# Patient Record
Sex: MALE | Race: BLACK OR AFRICAN AMERICAN | Employment: OTHER | URBAN - METROPOLITAN AREA
[De-identification: names, ages, dates, MRNs, and addresses within clinical notes are randomized per-mention and may not be internally consistent; named-entity substitution may affect disease eponyms.]

---

## 2017-08-07 PROBLEM — R50.9 FEVER: Status: ACTIVE | Noted: 2017-08-07

## 2017-08-10 PROBLEM — D72.829 LEUKOCYTOSIS: Status: RESOLVED | Noted: 2017-08-07 | Resolved: 2017-08-10

## 2023-02-03 ENCOUNTER — HOSPITAL ENCOUNTER (EMERGENCY)
Age: 73
Discharge: ANOTHER HOSPITAL | End: 2023-02-04
Attending: EMERGENCY MEDICINE
Payer: MEDICARE

## 2023-02-03 ENCOUNTER — APPOINTMENT (OUTPATIENT)
Dept: GENERAL RADIOLOGY | Age: 73
End: 2023-02-03
Attending: EMERGENCY MEDICINE
Payer: MEDICARE

## 2023-02-03 DIAGNOSIS — J96.01 ACUTE RESPIRATORY FAILURE WITH HYPOXIA AND HYPERCAPNIA (HCC): Primary | ICD-10-CM

## 2023-02-03 DIAGNOSIS — J96.02 ACUTE RESPIRATORY FAILURE WITH HYPOXIA AND HYPERCAPNIA (HCC): Primary | ICD-10-CM

## 2023-02-03 DIAGNOSIS — N18.9 CHRONIC KIDNEY DISEASE, UNSPECIFIED CKD STAGE: ICD-10-CM

## 2023-02-03 DIAGNOSIS — Z94.4 LIVER TRANSPLANT RECIPIENT (HCC): ICD-10-CM

## 2023-02-03 DIAGNOSIS — E87.20 LACTIC ACIDOSIS: ICD-10-CM

## 2023-02-03 DIAGNOSIS — J81.0 ACUTE PULMONARY EDEMA (HCC): ICD-10-CM

## 2023-02-03 LAB
ALBUMIN SERPL-MCNC: 3 G/DL (ref 3.5–5)
ALBUMIN/GLOB SERPL: 0.8 (ref 1.1–2.2)
ALP SERPL-CCNC: 109 U/L (ref 45–117)
ALT SERPL-CCNC: 26 U/L (ref 12–78)
AMMONIA PLAS-SCNC: 37 UMOL/L
ANION GAP BLD CALC-SCNC: 9 (ref 10–20)
ANION GAP SERPL CALC-SCNC: 10 MMOL/L (ref 5–15)
ARTERIAL PATENCY WRIST A: POSITIVE
AST SERPL-CCNC: 34 U/L (ref 15–37)
BASE DEFICIT BLD-SCNC: 11 MMOL/L
BASE DEFICIT BLD-SCNC: 18.9 MMOL/L
BASOPHILS # BLD: 0 K/UL (ref 0–0.1)
BASOPHILS NFR BLD: 0 % (ref 0–1)
BDY SITE: ABNORMAL
BILIRUB SERPL-MCNC: 0.3 MG/DL (ref 0.2–1)
BNP SERPL-MCNC: ABNORMAL PG/ML
BUN SERPL-MCNC: 94 MG/DL (ref 6–20)
BUN/CREAT SERPL: 14 (ref 12–20)
CA-I BLD-MCNC: 1.31 MMOL/L (ref 1.12–1.32)
CA-I BLD-SCNC: 1.26 MMOL/L (ref 1.12–1.32)
CALCIUM SERPL-MCNC: 8.6 MG/DL (ref 8.5–10.1)
CHLORIDE BLD-SCNC: 111 MMOL/L (ref 100–108)
CHLORIDE SERPL-SCNC: 107 MMOL/L (ref 97–108)
CO2 BLD-SCNC: 17 MMOL/L (ref 19–24)
CO2 SERPL-SCNC: 19 MMOL/L (ref 21–32)
COMMENT, HOLDF: NORMAL
CREAT SERPL-MCNC: 6.69 MG/DL (ref 0.7–1.3)
CREAT UR-MCNC: 6.4 MG/DL (ref 0.6–1.3)
DIFFERENTIAL METHOD BLD: ABNORMAL
EOSINOPHIL # BLD: 0.2 K/UL (ref 0–0.4)
EOSINOPHIL NFR BLD: 1 % (ref 0–7)
ERYTHROCYTE [DISTWIDTH] IN BLOOD BY AUTOMATED COUNT: 15.5 % (ref 11.5–14.5)
GAS FLOW.O2 O2 DELIVERY SYS: ABNORMAL
GAS FLOW.O2 SETTING OXYMISER: 18 BPM
GLOBULIN SER CALC-MCNC: 4 G/DL (ref 2–4)
GLUCOSE BLD STRIP.AUTO-MCNC: 295 MG/DL (ref 74–106)
GLUCOSE SERPL-MCNC: 291 MG/DL (ref 65–100)
HCO3 BLD-SCNC: 20.2 MMOL/L (ref 22–26)
HCO3 BLDA-SCNC: 16 MMOL/L
HCT VFR BLD AUTO: 34.3 % (ref 36.6–50.3)
HGB BLD-MCNC: 9.6 G/DL (ref 12.1–17)
IMM GRANULOCYTES # BLD AUTO: 0 K/UL (ref 0–0.04)
IMM GRANULOCYTES NFR BLD AUTO: 0 % (ref 0–0.5)
LACTATE BLD-SCNC: 7.5 MMOL/L (ref 0.4–2)
LACTATE SERPL-SCNC: 7 MMOL/L (ref 0.4–2)
LYMPHOCYTES # BLD: 8.1 K/UL (ref 0.8–3.5)
LYMPHOCYTES NFR BLD: 45 % (ref 12–49)
MCH RBC QN AUTO: 26.4 PG (ref 26–34)
MCHC RBC AUTO-ENTMCNC: 28 G/DL (ref 30–36.5)
MCV RBC AUTO: 94.2 FL (ref 80–99)
MONOCYTES # BLD: 0.5 K/UL (ref 0–1)
MONOCYTES NFR BLD: 3 % (ref 5–13)
NEUTS SEG # BLD: 9.3 K/UL (ref 1.8–8)
NEUTS SEG NFR BLD: 51 % (ref 32–75)
NRBC # BLD: 0 K/UL (ref 0–0.01)
NRBC BLD-RTO: 0 PER 100 WBC
O2/TOTAL GAS SETTING VFR VENT: 100 %
PCO2 BLD: 77.3 MMHG (ref 35–45)
PCO2 BLDV: 96 MMHG (ref 41–51)
PEEP RESPIRATORY: 8 CMH2O
PH BLD: 7.02 (ref 7.35–7.45)
PH BLDV: 6.84 (ref 7.32–7.42)
PLATELET # BLD AUTO: 315 K/UL (ref 150–400)
PMV BLD AUTO: 10.1 FL (ref 8.9–12.9)
PO2 BLD: 410 MMHG (ref 80–100)
PO2 BLDV: 65 MMHG (ref 25–40)
POTASSIUM BLD-SCNC: 5.5 MMOL/L (ref 3.5–5.5)
POTASSIUM SERPL-SCNC: 5.6 MMOL/L (ref 3.5–5.1)
PROT SERPL-MCNC: 7 G/DL (ref 6.4–8.2)
RBC # BLD AUTO: 3.64 M/UL (ref 4.1–5.7)
RBC MORPH BLD: ABNORMAL
RBC MORPH BLD: ABNORMAL
SAMPLES BEING HELD,HOLD: NORMAL
SAO2 % BLD: 69 %
SAO2 % BLD: 99.9 % (ref 92–97)
SERVICE CMNT-IMP: ABNORMAL
SERVICE CMNT-IMP: ABNORMAL
SODIUM BLD-SCNC: 137 MMOL/L (ref 136–145)
SODIUM SERPL-SCNC: 136 MMOL/L (ref 136–145)
SPECIMEN SITE: ABNORMAL
SPECIMEN TYPE: ABNORMAL
TROPONIN I SERPL HS-MCNC: 108 NG/L (ref 0–76)
VENTILATION MODE VENT: ABNORMAL
VT SETTING VENT: 500 ML
WBC # BLD AUTO: 18.1 K/UL (ref 4.1–11.1)

## 2023-02-03 PROCEDURE — 83605 ASSAY OF LACTIC ACID: CPT

## 2023-02-03 PROCEDURE — 93005 ELECTROCARDIOGRAM TRACING: CPT

## 2023-02-03 PROCEDURE — 83880 ASSAY OF NATRIURETIC PEPTIDE: CPT

## 2023-02-03 PROCEDURE — 36415 COLL VENOUS BLD VENIPUNCTURE: CPT

## 2023-02-03 PROCEDURE — 74018 RADEX ABDOMEN 1 VIEW: CPT

## 2023-02-03 PROCEDURE — 74011250636 HC RX REV CODE- 250/636: Performed by: EMERGENCY MEDICINE

## 2023-02-03 PROCEDURE — 80053 COMPREHEN METABOLIC PANEL: CPT

## 2023-02-03 PROCEDURE — 71045 X-RAY EXAM CHEST 1 VIEW: CPT

## 2023-02-03 PROCEDURE — 87040 BLOOD CULTURE FOR BACTERIA: CPT

## 2023-02-03 PROCEDURE — 74011000250 HC RX REV CODE- 250: Performed by: EMERGENCY MEDICINE

## 2023-02-03 PROCEDURE — 74011250636 HC RX REV CODE- 250/636

## 2023-02-03 PROCEDURE — 84484 ASSAY OF TROPONIN QUANT: CPT

## 2023-02-03 PROCEDURE — 74011000250 HC RX REV CODE- 250

## 2023-02-03 PROCEDURE — 99285 EMERGENCY DEPT VISIT HI MDM: CPT

## 2023-02-03 PROCEDURE — 82947 ASSAY GLUCOSE BLOOD QUANT: CPT

## 2023-02-03 PROCEDURE — 31500 INSERT EMERGENCY AIRWAY: CPT

## 2023-02-03 PROCEDURE — 96375 TX/PRO/DX INJ NEW DRUG ADDON: CPT

## 2023-02-03 PROCEDURE — 36600 WITHDRAWAL OF ARTERIAL BLOOD: CPT

## 2023-02-03 PROCEDURE — 74011000258 HC RX REV CODE- 258: Performed by: EMERGENCY MEDICINE

## 2023-02-03 PROCEDURE — 82803 BLOOD GASES ANY COMBINATION: CPT

## 2023-02-03 PROCEDURE — 94002 VENT MGMT INPAT INIT DAY: CPT

## 2023-02-03 PROCEDURE — 85025 COMPLETE CBC W/AUTO DIFF WBC: CPT

## 2023-02-03 PROCEDURE — 82140 ASSAY OF AMMONIA: CPT

## 2023-02-03 PROCEDURE — 96365 THER/PROPH/DIAG IV INF INIT: CPT

## 2023-02-03 RX ORDER — PROPOFOL 10 MG/ML
INJECTION, EMULSION INTRAVENOUS
Status: COMPLETED
Start: 2023-02-03 | End: 2023-02-03

## 2023-02-03 RX ORDER — ETOMIDATE 2 MG/ML
0.25 INJECTION INTRAVENOUS
Status: COMPLETED | OUTPATIENT
Start: 2023-02-03 | End: 2023-02-03

## 2023-02-03 RX ORDER — ROCURONIUM BROMIDE 10 MG/ML
0.9 INJECTION, SOLUTION INTRAVENOUS
Status: COMPLETED | OUTPATIENT
Start: 2023-02-03 | End: 2023-02-03

## 2023-02-03 RX ORDER — NOREPINEPHRINE BITARTRATE/D5W 8 MG/250ML
.5-2 PLASTIC BAG, INJECTION (ML) INTRAVENOUS
Status: DISCONTINUED | OUTPATIENT
Start: 2023-02-03 | End: 2023-02-04 | Stop reason: HOSPADM

## 2023-02-03 RX ORDER — ETOMIDATE 2 MG/ML
INJECTION INTRAVENOUS
Status: COMPLETED
Start: 2023-02-03 | End: 2023-02-03

## 2023-02-03 RX ORDER — ROCURONIUM BROMIDE 10 MG/ML
INJECTION, SOLUTION INTRAVENOUS
Status: COMPLETED
Start: 2023-02-03 | End: 2023-02-03

## 2023-02-03 RX ORDER — FENTANYL CITRATE 50 UG/ML
50 INJECTION, SOLUTION INTRAMUSCULAR; INTRAVENOUS ONCE
Status: COMPLETED | OUTPATIENT
Start: 2023-02-03 | End: 2023-02-03

## 2023-02-03 RX ORDER — FUROSEMIDE 10 MG/ML
40 INJECTION INTRAMUSCULAR; INTRAVENOUS
Status: COMPLETED | OUTPATIENT
Start: 2023-02-03 | End: 2023-02-03

## 2023-02-03 RX ORDER — PROPOFOL 10 MG/ML
50 VIAL (ML) INTRAVENOUS
Status: DISCONTINUED | OUTPATIENT
Start: 2023-02-03 | End: 2023-02-04 | Stop reason: HOSPADM

## 2023-02-03 RX ORDER — LEVOFLOXACIN 5 MG/ML
750 INJECTION, SOLUTION INTRAVENOUS ONCE
Status: COMPLETED | OUTPATIENT
Start: 2023-02-04 | End: 2023-02-04

## 2023-02-03 RX ORDER — LEVOFLOXACIN 5 MG/ML
500 INJECTION, SOLUTION INTRAVENOUS
Status: DISCONTINUED | OUTPATIENT
Start: 2023-02-06 | End: 2023-02-04 | Stop reason: HOSPADM

## 2023-02-03 RX ADMIN — PROPOFOL 50 MCG/KG/MIN: 10 INJECTION, EMULSION INTRAVENOUS at 23:07

## 2023-02-03 RX ADMIN — FUROSEMIDE 40 MG: 10 INJECTION, SOLUTION INTRAMUSCULAR; INTRAVENOUS at 23:04

## 2023-02-03 RX ADMIN — NOREPINEPHRINE BITARTRATE 10 MCG/MIN: 1 SOLUTION INTRAVENOUS at 23:35

## 2023-02-03 RX ADMIN — ETOMIDATE 20 MG: 2 INJECTION INTRAVENOUS at 23:07

## 2023-02-03 RX ADMIN — FENTANYL CITRATE 50 MCG: 50 INJECTION, SOLUTION INTRAMUSCULAR; INTRAVENOUS at 23:05

## 2023-02-03 RX ADMIN — ROCURONIUM BROMIDE 80 MG: 10 INJECTION, SOLUTION INTRAVENOUS at 23:05

## 2023-02-03 RX ADMIN — ETOMIDATE 20 MG: 2 INJECTION, SOLUTION INTRAVENOUS at 23:07

## 2023-02-04 ENCOUNTER — APPOINTMENT (OUTPATIENT)
Dept: GENERAL RADIOLOGY | Age: 73
End: 2023-02-04
Attending: EMERGENCY MEDICINE
Payer: MEDICARE

## 2023-02-04 ENCOUNTER — OFFICE VISIT (OUTPATIENT)
Dept: EMERGENCY DEPT | Age: 73
End: 2023-02-04
Attending: EMERGENCY MEDICINE
Payer: MEDICARE

## 2023-02-04 VITALS
DIASTOLIC BLOOD PRESSURE: 78 MMHG | SYSTOLIC BLOOD PRESSURE: 182 MMHG | HEART RATE: 57 BPM | HEIGHT: 69 IN | WEIGHT: 190.48 LBS | TEMPERATURE: 98.2 F | RESPIRATION RATE: 8 BRPM | BODY MASS INDEX: 28.21 KG/M2 | OXYGEN SATURATION: 100 %

## 2023-02-04 LAB
ATRIAL RATE: 103 BPM
ATRIAL RATE: 71 BPM
BASE DEFICIT BLD-SCNC: 4.8 MMOL/L
CALCULATED P AXIS, ECG09: 59 DEGREES
CALCULATED P AXIS, ECG09: 65 DEGREES
CALCULATED R AXIS, ECG10: 70 DEGREES
CALCULATED R AXIS, ECG10: 75 DEGREES
CALCULATED T AXIS, ECG11: 51 DEGREES
CALCULATED T AXIS, ECG11: 92 DEGREES
DIAGNOSIS, 93000: NORMAL
DIAGNOSIS, 93000: NORMAL
HCO3 BLD-SCNC: 19.5 MMOL/L (ref 22–26)
P-R INTERVAL, ECG05: 188 MS
P-R INTERVAL, ECG05: 214 MS
PCO2 BLD: 32 MMHG (ref 35–45)
PH BLD: 7.39 (ref 7.35–7.45)
PO2 BLD: 153 MMHG (ref 80–100)
Q-T INTERVAL, ECG07: 284 MS
Q-T INTERVAL, ECG07: 394 MS
QRS DURATION, ECG06: 80 MS
QRS DURATION, ECG06: 82 MS
QTC CALCULATION (BEZET), ECG08: 372 MS
QTC CALCULATION (BEZET), ECG08: 428 MS
SAO2 % BLD: 99.4 % (ref 92–97)
SPECIMEN TYPE: ABNORMAL
VENTRICULAR RATE, ECG03: 103 BPM
VENTRICULAR RATE, ECG03: 71 BPM

## 2023-02-04 PROCEDURE — 96365 THER/PROPH/DIAG IV INF INIT: CPT

## 2023-02-04 PROCEDURE — 74011250636 HC RX REV CODE- 250/636: Performed by: EMERGENCY MEDICINE

## 2023-02-04 PROCEDURE — 96375 TX/PRO/DX INJ NEW DRUG ADDON: CPT

## 2023-02-04 PROCEDURE — 82803 BLOOD GASES ANY COMBINATION: CPT

## 2023-02-04 PROCEDURE — 75810000455 HC PLCMT CENT VENOUS CATH LVL 2 5182

## 2023-02-04 PROCEDURE — 71045 X-RAY EXAM CHEST 1 VIEW: CPT

## 2023-02-04 PROCEDURE — 94003 VENT MGMT INPAT SUBQ DAY: CPT

## 2023-02-04 PROCEDURE — 76937 US GUIDE VASCULAR ACCESS: CPT

## 2023-02-04 PROCEDURE — 96376 TX/PRO/DX INJ SAME DRUG ADON: CPT

## 2023-02-04 PROCEDURE — 96366 THER/PROPH/DIAG IV INF ADDON: CPT

## 2023-02-04 PROCEDURE — 74011000250 HC RX REV CODE- 250: Performed by: EMERGENCY MEDICINE

## 2023-02-04 RX ORDER — FENTANYL CITRATE 50 UG/ML
50 INJECTION, SOLUTION INTRAMUSCULAR; INTRAVENOUS ONCE
Status: COMPLETED | OUTPATIENT
Start: 2023-02-04 | End: 2023-02-04

## 2023-02-04 RX ADMIN — PROPOFOL 50 MCG/KG/MIN: 10 INJECTION, EMULSION INTRAVENOUS at 03:00

## 2023-02-04 RX ADMIN — FENTANYL CITRATE 50 MCG: 50 INJECTION, SOLUTION INTRAMUSCULAR; INTRAVENOUS at 03:45

## 2023-02-04 RX ADMIN — LEVOFLOXACIN 750 MG: 5 INJECTION, SOLUTION INTRAVENOUS at 02:08

## 2023-02-04 RX ADMIN — CEFEPIME 2 G: 2 INJECTION, POWDER, FOR SOLUTION INTRAVENOUS at 02:08

## 2023-02-04 RX ADMIN — SODIUM CHLORIDE 1000 ML: 9 INJECTION, SOLUTION INTRAVENOUS at 03:40

## 2023-02-04 NOTE — PROGRESS NOTES
Day #1 of levofloxacin  Indication:  HAP  Current regimen:  cefepime/levofloxacin    Recent Labs     23  2223   WBC 18.1*   CREA 6.69*   BUN 94*     Temp (24hrs), Av.2 °F (36.8 °C), Min:98.2 °F (36.8 °C), Max:98.2 °F (36.8 °C)    Est CrCl: 11 ml/min (non dialysis pt)    Plan: Change to levofloxacin 750 mg x1   then 500 mg q48h

## 2023-02-04 NOTE — ED PROVIDER NOTES
58-year-old male presents from home via EMS with respiratory distress. 911 was called for \"I cannot breathe\". EMS arrived to find the patient tachypneic and hypoxic at 61% on room air. They placed him on CPAP and began transport to the hospital.  He states prior to arrival patient became less responsive and less alert. He is got a history of liver and kidney disease. He has a history of liver transplant. We do not know if he has any cardiac or lung disease. No family available at the bedside at this time to give any further background information. He was last admitted here in 2017. Past Medical History:   Diagnosis Date    Hypertension     Liver transplanted [facility])        Past Surgical History:   Procedure Laterality Date    HX LIVER TRANSPLANT      HX OTHER SURGICAL      right elbow         No family history on file. Social History     Socioeconomic History    Marital status:      Spouse name: Not on file    Number of children: Not on file    Years of education: Not on file    Highest education level: Not on file   Occupational History    Not on file   Tobacco Use    Smoking status: Never    Smokeless tobacco: Never   Substance and Sexual Activity    Alcohol use: No    Drug use: Not on file    Sexual activity: Not on file   Other Topics Concern    Not on file   Social History Narrative    Not on file     Social Determinants of Health     Financial Resource Strain: Not on file   Food Insecurity: Not on file   Transportation Needs: Not on file   Physical Activity: Not on file   Stress: Not on file   Social Connections: Not on file   Intimate Partner Violence: Not on file   Housing Stability: Not on file         ALLERGIES: Patient has no known allergies.     Review of Systems   Unable to perform ROS: Acuity of condition     Vitals:    02/03/23 2339 02/03/23 2342 02/03/23 2357 02/04/23 0012   BP: (!) 78/55 (!) 89/65 (!) 147/71 111/64   Pulse: 62 62 (!) 57 (!) 56   Resp:       Temp:       SpO2: 99% 99% 100% 98%   Weight:       Height:                Physical Exam  Vitals and nursing note reviewed. Constitutional:       General: He is in acute distress. Appearance: He is well-developed. He is ill-appearing and diaphoretic. Comments: Obtunded   HENT:      Head: Normocephalic and atraumatic. Mouth/Throat:      Mouth: Mucous membranes are dry. Eyes:      Pupils: Pupils are equal, round, and reactive to light. Cardiovascular:      Rate and Rhythm: Tachycardia present. Pulmonary:      Effort: Respiratory distress present. Comments: Patient in severe respiratory distress with decreased respiratory effort. He has crackles bilaterally of both lung fields. Abdominal and other accessory muscle in use. No wheezing. Abdominal:      General: There is distension. Musculoskeletal:         General: Normal range of motion. Cervical back: Normal range of motion and neck supple. Skin:     General: Skin is warm. Neurological:      Comments: Patient obtunded in severe respiratory distress. Medical Decision Making  Amount and/or Complexity of Data Reviewed  Labs: ordered. Radiology: ordered. ECG/medicine tests: ordered. Decision-making details documented in ED Course. Risk  Prescription drug management. Decision regarding hospitalization. ED Course as of 02/04/23 0157   Fri Feb 03, 2023 2227 ECG RHYTHM ANALYSIS ADULT  ED EKG interpretation:  Rhythm: sinus tach with frequent PVC's. . Rate (approx.): 103. Axis: normal.  ST segment:  No concerning ST elevations or depressions. This EKG was independently interpreted by Nicole Rodriguez MD,ED Provider. [JM]   0127 Repeat ECG  ED EKG interpretation:  Rhythm: normal sinus rhythm. Rate (approx.): 71. Axis: normal.  ST segment:  No concerning ST elevations or depressions. This EKG was independently interpreted by Nicole Rodriguez MD,ED Provider.    [JM]      ED Course User Index  [JM] Reina Roberts MD     Xray images independently reviewed and interpreted by me. Awaiting final radiology report before making treatment and disposition plan. My Findings: pulmonary edema     Perfect Serve Consult for Admission  11:35 PM    ED Room Number: ER12/12  Patient Name and age:  Ehsan Hummel 71 y.o.  male  Working Diagnosis:   1. Acute respiratory failure with hypoxia and hypercapnia (HCC)    2. Acute pulmonary edema (HCC)    3. Lactic acidosis    4. Chronic kidney disease, unspecified CKD stage    5. Liver transplant recipient [facility])        COVID-19 Suspicion:  no  Sepsis present:  yes  Reassessment needed: yes  Code Status:  Full Code  Readmission: no  Isolation Requirements:  no  Recommended Level of Care:  ICU  Department: [address] [place] [address] Adult ED - 21   Admitting Provider:     Other: Patient intubated on arrival with severe respiratory distress. Has a history of liver transplant and chronic kidney disease but not on dialysis. Pulmonary edema on chest x-ray. Severe respiratory acidosis. Vital signs much improved after intubation. Currently on propofol starting to get low blood pressures. Starting peripheral Levophed. Total critical care time spent exclusive of procedures:  45 minutes. Procedures    Procedure Note - Intubation:   Performed by Allison Griffith MD .     Immediately prior to the procedure, the patient was reevaluated and found suitable for the planned procedure and any planned medications. Immediately prior to the procedure a time out was called to verify the correct patient, procedure, equipment, staff, and marking as appropriate. Medications given were etomidate and rocuronium (Zemuron). A number 7.5 cuffed   ETT was placed to 21 cm at the teeth. Placement was evaluated by noting bilateral, symmetric breath sounds, good end-tidal CO2 detector color change , no breath sounds over stomach, and chest x-ray visualization. Attempts required: 1. Complications: none. RSI was used. .  The procedure was tolerated well. Procedure Note - Central Venous Access:   Performed by Carlos Washington MD  2/4/2023, Time: 1:57 AM    Obtained informed Consent. Immediately prior to the procedure, the patient was reevaluated and found suitable for the planned procedure and any planned medications. Immediately prior to the procedure a time out was called to verify the correct patient, procedure, equipment, staff, and marking as appropriate. The site was prepped with ChloraPrep. Using Seldinger technique a Triple Lumen CVC was placed in the Right IJ via direct cannulation with 1 number of attempts for IV Access. Ultrasound Guidance was utilized. There was good blood return. The following complications were encountered: None. A follow-up chest x-ray was ordered post procedure. The procedure was tolerated well.   Total time:  45 minutes

## 2023-02-04 NOTE — PROGRESS NOTES
Day #1 of cefepime  Indication:  HAP  Current regimen:  cefepime/levaquin    Recent Labs     23  2223   WBC 18.1*   CREA 6.69*   BUN 94*     Temp (24hrs), Av.2 °F (36.8 °C), Min:98.2 °F (36.8 °C), Max:98.2 °F (36.8 °C)    Est CrCl: 11 ml/min (patient is not on dialysis)    Plan: Change to cefepime 2g x1 then 1g q12h

## 2023-02-04 NOTE — ED TRIAGE NOTES
TRANSFER - OUT REPORT:    Verbal report given to SANTOSH VELEZ [facility]) on Mona Bahena  being transferred to [facility](unit) for urgent transfer       Report consisted of patients Situation, Background, Assessment and   Recommendations(SBAR). Information from the following report(s) SBAR, Kardex, and ED Summary was reviewed with the receiving nurse. Lines:   Triple Lumen 02/04/23 Right Subclavian (Active)   Central Line Being Utilized Yes 02/04/23 0210   Site Assessment Clean, dry, & intact 02/04/23 0210   Infiltration Assessment 0 02/04/23 0210   Dressing Status Clean, dry, & intact 02/04/23 0210       Peripheral IV 02/03/23 Left Antecubital (Active)       Peripheral IV 02/03/23 Right Antecubital (Active)        Opportunity for questions and clarification was provided.       Patient transported with:   Monitor  Ventilator  Critical Care transport

## 2023-02-04 NOTE — ED NOTES
2214 Patient arrived hypoxic on CPAP SATS 54%  Dr Carly Felipe at bedside preparing for intubaton  Orders received for etomidate 20 mg rocuronium 80 mg    /125  RR 22 O2 Sats 96%  2217 etomidate and rocuronium given  2221 orders received for propofol 50 mcg

## 2023-02-05 LAB
BACTERIA SPEC CULT: NORMAL
SERVICE CMNT-IMP: NORMAL

## 2023-02-09 LAB
BACTERIA SPEC CULT: NORMAL
SERVICE CMNT-IMP: NORMAL

## 2023-05-24 RX ORDER — TACROLIMUS 0.5 MG/1
0.5 CAPSULE ORAL 2 TIMES DAILY
COMMUNITY

## 2023-05-24 RX ORDER — MYCOPHENOLATE MOFETIL 500 MG/1
500 TABLET ORAL 2 TIMES DAILY
COMMUNITY